# Patient Record
Sex: MALE | Race: WHITE | NOT HISPANIC OR LATINO | URBAN - METROPOLITAN AREA
[De-identification: names, ages, dates, MRNs, and addresses within clinical notes are randomized per-mention and may not be internally consistent; named-entity substitution may affect disease eponyms.]

---

## 2019-04-08 ENCOUNTER — EMERGENCY (EMERGENCY)
Facility: HOSPITAL | Age: 34
LOS: 1 days | Discharge: HOME | End: 2019-04-08
Attending: EMERGENCY MEDICINE | Admitting: EMERGENCY MEDICINE
Payer: MEDICAID

## 2019-04-08 VITALS
TEMPERATURE: 98 F | OXYGEN SATURATION: 100 % | DIASTOLIC BLOOD PRESSURE: 59 MMHG | HEART RATE: 80 BPM | SYSTOLIC BLOOD PRESSURE: 124 MMHG | RESPIRATION RATE: 16 BRPM

## 2019-04-08 PROCEDURE — 99284 EMERGENCY DEPT VISIT MOD MDM: CPT | Mod: 25

## 2019-04-08 PROCEDURE — 93010 ELECTROCARDIOGRAM REPORT: CPT

## 2019-04-08 NOTE — ED ADULT TRIAGE NOTE - CHIEF COMPLAINT QUOTE
pt c/o intermittent left sided chest pain with radiation to left shoulder and left arm x1 week along with VIDAL- denies any PMH- appears in NAD, speaking in full sentences, breathing even and unlabored

## 2019-04-09 VITALS
TEMPERATURE: 98 F | DIASTOLIC BLOOD PRESSURE: 61 MMHG | RESPIRATION RATE: 17 BRPM | OXYGEN SATURATION: 100 % | HEART RATE: 78 BPM | SYSTOLIC BLOOD PRESSURE: 132 MMHG

## 2019-04-09 LAB
ALBUMIN SERPL ELPH-MCNC: 4.3 G/DL — SIGNIFICANT CHANGE UP (ref 3.3–5)
ALP SERPL-CCNC: 51 U/L — SIGNIFICANT CHANGE UP (ref 40–120)
ALT FLD-CCNC: 114 U/L — HIGH (ref 4–41)
ANION GAP SERPL CALC-SCNC: 11 MMO/L — SIGNIFICANT CHANGE UP (ref 7–14)
AST SERPL-CCNC: 77 U/L — HIGH (ref 4–40)
BASOPHILS # BLD AUTO: 0.08 K/UL — SIGNIFICANT CHANGE UP (ref 0–0.2)
BASOPHILS NFR BLD AUTO: 0.9 % — SIGNIFICANT CHANGE UP (ref 0–2)
BILIRUB SERPL-MCNC: 0.4 MG/DL — SIGNIFICANT CHANGE UP (ref 0.2–1.2)
BUN SERPL-MCNC: 18 MG/DL — SIGNIFICANT CHANGE UP (ref 7–23)
CALCIUM SERPL-MCNC: 9.4 MG/DL — SIGNIFICANT CHANGE UP (ref 8.4–10.5)
CHLORIDE SERPL-SCNC: 104 MMOL/L — SIGNIFICANT CHANGE UP (ref 98–107)
CO2 SERPL-SCNC: 27 MMOL/L — SIGNIFICANT CHANGE UP (ref 22–31)
CREAT SERPL-MCNC: 0.86 MG/DL — SIGNIFICANT CHANGE UP (ref 0.5–1.3)
EOSINOPHIL # BLD AUTO: 0.35 K/UL — SIGNIFICANT CHANGE UP (ref 0–0.5)
EOSINOPHIL NFR BLD AUTO: 4.1 % — SIGNIFICANT CHANGE UP (ref 0–6)
GLUCOSE SERPL-MCNC: 108 MG/DL — HIGH (ref 70–99)
HCT VFR BLD CALC: 44.9 % — SIGNIFICANT CHANGE UP (ref 39–50)
HGB BLD-MCNC: 13.9 G/DL — SIGNIFICANT CHANGE UP (ref 13–17)
IMM GRANULOCYTES NFR BLD AUTO: 0.3 % — SIGNIFICANT CHANGE UP (ref 0–1.5)
LYMPHOCYTES # BLD AUTO: 3.05 K/UL — SIGNIFICANT CHANGE UP (ref 1–3.3)
LYMPHOCYTES # BLD AUTO: 35.3 % — SIGNIFICANT CHANGE UP (ref 13–44)
MCHC RBC-ENTMCNC: 28.9 PG — SIGNIFICANT CHANGE UP (ref 27–34)
MCHC RBC-ENTMCNC: 31 % — LOW (ref 32–36)
MCV RBC AUTO: 93.3 FL — SIGNIFICANT CHANGE UP (ref 80–100)
MONOCYTES # BLD AUTO: 0.65 K/UL — SIGNIFICANT CHANGE UP (ref 0–0.9)
MONOCYTES NFR BLD AUTO: 7.5 % — SIGNIFICANT CHANGE UP (ref 2–14)
NEUTROPHILS # BLD AUTO: 4.47 K/UL — SIGNIFICANT CHANGE UP (ref 1.8–7.4)
NEUTROPHILS NFR BLD AUTO: 51.9 % — SIGNIFICANT CHANGE UP (ref 43–77)
NRBC # FLD: 0 K/UL — SIGNIFICANT CHANGE UP (ref 0–0)
PLATELET # BLD AUTO: 181 K/UL — SIGNIFICANT CHANGE UP (ref 150–400)
PMV BLD: 11.9 FL — SIGNIFICANT CHANGE UP (ref 7–13)
POTASSIUM SERPL-MCNC: 4.1 MMOL/L — SIGNIFICANT CHANGE UP (ref 3.5–5.3)
POTASSIUM SERPL-SCNC: 4.1 MMOL/L — SIGNIFICANT CHANGE UP (ref 3.5–5.3)
PROT SERPL-MCNC: 7.2 G/DL — SIGNIFICANT CHANGE UP (ref 6–8.3)
RBC # BLD: 4.81 M/UL — SIGNIFICANT CHANGE UP (ref 4.2–5.8)
RBC # FLD: 11.9 % — SIGNIFICANT CHANGE UP (ref 10.3–14.5)
SODIUM SERPL-SCNC: 142 MMOL/L — SIGNIFICANT CHANGE UP (ref 135–145)
TROPONIN T, HIGH SENSITIVITY: 9 NG/L — SIGNIFICANT CHANGE UP (ref ?–14)
TROPONIN T, HIGH SENSITIVITY: 9 NG/L — SIGNIFICANT CHANGE UP (ref ?–14)
WBC # BLD: 8.63 K/UL — SIGNIFICANT CHANGE UP (ref 3.8–10.5)
WBC # FLD AUTO: 8.63 K/UL — SIGNIFICANT CHANGE UP (ref 3.8–10.5)

## 2019-04-09 PROCEDURE — 71046 X-RAY EXAM CHEST 2 VIEWS: CPT | Mod: 26

## 2019-04-09 RX ORDER — IBUPROFEN 200 MG
600 TABLET ORAL ONCE
Qty: 0 | Refills: 0 | Status: COMPLETED | OUTPATIENT
Start: 2019-04-09 | End: 2019-04-09

## 2019-04-09 RX ADMIN — Medication 600 MILLIGRAM(S): at 03:57

## 2019-04-09 NOTE — ED ADULT NURSE NOTE - OBJECTIVE STATEMENT
Pt is a 33 year old male reporting to the ED for left side chest pain. Pt reports cp started 1 week ago. Pt reports pain radiated to left back and left arm. Pt reports numbness and tingling in left arm. Pt reports numbness increased today causing him to come to the ED. Pt is AOx4. Pt reports VIDAL. Pt respirations even and unlabored, spo2 100 % on room air. Pt appears to be in NAD, resting in bed. Pt reports father recently had MI. Pt placed on cardiac monitor showing NSR. Pt denies fever, n/v/d. Pt reports umbilical hernia. 18 g iv placed in right hand, labs drawn, pending review, will continue to monitor.

## 2019-04-09 NOTE — ED PROVIDER NOTE - ATTENDING CONTRIBUTION TO CARE
33M no PMH p/w pain from L chest, radiating to shoulder, x4d, intermittent, non-exertional. Associated w/ intermittent tingling to L hand and elbow. Pain is worse w/ movement. HAs not taken any pain meds. also w/ several weeks of vague SOB, non-exertional. No other systemic symptoms. Vitals wnl, exam as above.  ddx: Likely MSK, less likely ACs. clinically not PE, tamponade, dissection, PTX, perf, myocarditis, mediastinitis.   CBC, cmp, trop, CXR, symptom control, reassess.

## 2019-04-09 NOTE — ED PROVIDER NOTE - PHYSICAL EXAMINATION
no LE edema, normal equal distal pulses. pain reproduced w/ truncal movement. no overlying skin changes. sensation intact, strength 5/5.

## 2019-04-09 NOTE — ED ADULT NURSE NOTE - NSIMPLEMENTINTERV_GEN_ALL_ED
Implemented All Universal Safety Interventions:  [place] to call system. Call bell, personal items and telephone within reach. Instruct patient to call for assistance. Room bathroom lighting operational. Non-slip footwear when patient is off stretcher. Physically safe environment: no spills, clutter or unnecessary equipment. Stretcher in lowest position, wheels locked, appropriate side rails in place.

## 2019-04-09 NOTE — ED PROVIDER NOTE - NSFOLLOWUPINSTRUCTIONS_ED_ALL_ED_FT
Follow up with your primary physician in 2-3 days. If needed call [phone] to find a primary care physician or call  [phone] to schedule an appointment with the general medicine clinic.     You can take acetaminophen (aka tylenol, 1000 mg every 6 hours) or ibuprofen (600 mg every 6 hours) for pain or fever. Do not exceed 4000 mg acetaminophen (tylenol) in a 24 hour period. Be aware if any of your other medications contain acetaminophen so as not to exceed the maximum daily dose.    Return to the ER for worsening pain, shortness of breath, or any other new concerning symptoms.

## 2019-04-09 NOTE — ED PROVIDER NOTE - CLINICAL SUMMARY MEDICAL DECISION MAKING FREE TEXT BOX
Jonathan Weil, PGY2 - low risk for ACS w/ HEART score = 1 before trops. PERC negative. Will check trops, cxr, if negative suspect msk and will refer for outpt f/u.

## 2019-04-09 NOTE — ED PROVIDER NOTE - MUSCULOSKELETAL, MLM
Palpation along the sternocostal margin of the 6th-7th ribs reproduces pain. Palpation over the L trapezius also reproduces pain

## 2019-04-09 NOTE — ED PROVIDER NOTE - OBJECTIVE STATEMENT
33M no sig PMH p/w chest pain for 4 days. He describes an intermittent left sided tightness that radiates to the back of his L shoulder, associated w/ a burning sensation in this L hand that radiates up his arm. No known inciting/aggravating factors that he knows of, though he does use his arms for physical activity at his job ([employer]). Also w/ mild intermittent shortness of breath, though this does not have a significant exertional component (contrary to triage note). No meds tried for relief. 33M no sig PMH p/w chest pain for 4 days. He describes an intermittent left sided tightness that radiates to the back of his L shoulder, associated w/ a burning sensation in this L hand that radiates up his arm. No known inciting/aggravating factors that he knows of, though he does use his arms for physical activity at his job ([employer]). Also w/ mild intermittent shortness of breath, though this does not have a significant exertional component (contrary to triage note). No meds tried for relief.  [place]: 33M no PMH p/w pain from L chest, radiating to shoulder, x4d, intermittent, non-exertional. Associated w/ intermittent tingling to L hand and elbow. Pain is worse w/ movement. HAs not taken any pain meds. also w/ several weeks of vague SOB, non-exertional. Denies associated NVD, lightheaded, diaphoresis, palpitations, cough/rhinorrhea, black/bloody stool, LE pain/swelling, focal weakness, recent travel/immobilization, abd pain, urinary complaints, f/c. No hormone use. Dad CAD 70s, no other FMH CAD/clots/sudden death. No prior cardiac w/u.

## 2019-11-01 ENCOUNTER — OUTPATIENT (OUTPATIENT)
Dept: OUTPATIENT SERVICES | Facility: HOSPITAL | Age: 34
LOS: 1 days | End: 2019-11-01
Payer: MEDICAID

## 2019-11-01 PROCEDURE — G9001: CPT

## 2019-11-11 DIAGNOSIS — Z71.89 OTHER SPECIFIED COUNSELING: ICD-10-CM

## 2021-09-16 ENCOUNTER — INPATIENT (INPATIENT)
Facility: HOSPITAL | Age: 36
LOS: 1 days | Discharge: HOME | End: 2021-09-18
Attending: STUDENT IN AN ORGANIZED HEALTH CARE EDUCATION/TRAINING PROGRAM | Admitting: STUDENT IN AN ORGANIZED HEALTH CARE EDUCATION/TRAINING PROGRAM
Payer: MEDICAID

## 2021-09-16 ENCOUNTER — TRANSCRIPTION ENCOUNTER (OUTPATIENT)
Age: 36
End: 2021-09-16

## 2021-09-16 VITALS
TEMPERATURE: 98 F | HEIGHT: 70 IN | OXYGEN SATURATION: 99 % | RESPIRATION RATE: 20 BRPM | SYSTOLIC BLOOD PRESSURE: 133 MMHG | DIASTOLIC BLOOD PRESSURE: 80 MMHG | HEART RATE: 98 BPM

## 2021-09-16 DIAGNOSIS — Z98.890 OTHER SPECIFIED POSTPROCEDURAL STATES: Chronic | ICD-10-CM

## 2021-09-16 DIAGNOSIS — R10.9 UNSPECIFIED ABDOMINAL PAIN: ICD-10-CM

## 2021-09-16 LAB
ALBUMIN SERPL ELPH-MCNC: 4.3 G/DL — SIGNIFICANT CHANGE UP (ref 3.3–5)
ALP SERPL-CCNC: 56 U/L — SIGNIFICANT CHANGE UP (ref 40–120)
ALT FLD-CCNC: 45 U/L — HIGH (ref 4–41)
ANION GAP SERPL CALC-SCNC: 13 MMOL/L — SIGNIFICANT CHANGE UP (ref 7–14)
APTT BLD: 32.9 SEC — SIGNIFICANT CHANGE UP (ref 27–36.3)
AST SERPL-CCNC: 33 U/L — SIGNIFICANT CHANGE UP (ref 4–40)
BASOPHILS # BLD AUTO: 0.05 K/UL — SIGNIFICANT CHANGE UP (ref 0–0.2)
BASOPHILS NFR BLD AUTO: 0.5 % — SIGNIFICANT CHANGE UP (ref 0–2)
BILIRUB SERPL-MCNC: 0.4 MG/DL — SIGNIFICANT CHANGE UP (ref 0.2–1.2)
BLD GP AB SCN SERPL QL: NEGATIVE — SIGNIFICANT CHANGE UP
BLOOD GAS VENOUS COMPREHENSIVE RESULT: SIGNIFICANT CHANGE UP
BUN SERPL-MCNC: 12 MG/DL — SIGNIFICANT CHANGE UP (ref 7–23)
CALCIUM SERPL-MCNC: 9.5 MG/DL — SIGNIFICANT CHANGE UP (ref 8.4–10.5)
CHLORIDE SERPL-SCNC: 99 MMOL/L — SIGNIFICANT CHANGE UP (ref 98–107)
CO2 SERPL-SCNC: 28 MMOL/L — SIGNIFICANT CHANGE UP (ref 22–31)
CREAT SERPL-MCNC: 0.91 MG/DL — SIGNIFICANT CHANGE UP (ref 0.5–1.3)
EOSINOPHIL # BLD AUTO: 0.29 K/UL — SIGNIFICANT CHANGE UP (ref 0–0.5)
EOSINOPHIL NFR BLD AUTO: 3 % — SIGNIFICANT CHANGE UP (ref 0–6)
GLUCOSE SERPL-MCNC: 118 MG/DL — HIGH (ref 70–99)
HCT VFR BLD CALC: 40.5 % — SIGNIFICANT CHANGE UP (ref 39–50)
HGB BLD-MCNC: 13.5 G/DL — SIGNIFICANT CHANGE UP (ref 13–17)
IANC: 6.42 K/UL — SIGNIFICANT CHANGE UP (ref 1.5–8.5)
IMM GRANULOCYTES NFR BLD AUTO: 0.4 % — SIGNIFICANT CHANGE UP (ref 0–1.5)
INR BLD: 1.09 RATIO — SIGNIFICANT CHANGE UP (ref 0.88–1.16)
LIDOCAIN IGE QN: 11 U/L — SIGNIFICANT CHANGE UP (ref 7–60)
LYMPHOCYTES # BLD AUTO: 2.2 K/UL — SIGNIFICANT CHANGE UP (ref 1–3.3)
LYMPHOCYTES # BLD AUTO: 22.5 % — SIGNIFICANT CHANGE UP (ref 13–44)
MCHC RBC-ENTMCNC: 29.4 PG — SIGNIFICANT CHANGE UP (ref 27–34)
MCHC RBC-ENTMCNC: 33.3 GM/DL — SIGNIFICANT CHANGE UP (ref 32–36)
MCV RBC AUTO: 88.2 FL — SIGNIFICANT CHANGE UP (ref 80–100)
MONOCYTES # BLD AUTO: 0.76 K/UL — SIGNIFICANT CHANGE UP (ref 0–0.9)
MONOCYTES NFR BLD AUTO: 7.8 % — SIGNIFICANT CHANGE UP (ref 2–14)
NEUTROPHILS # BLD AUTO: 6.42 K/UL — SIGNIFICANT CHANGE UP (ref 1.8–7.4)
NEUTROPHILS NFR BLD AUTO: 65.8 % — SIGNIFICANT CHANGE UP (ref 43–77)
NRBC # BLD: 0 /100 WBCS — SIGNIFICANT CHANGE UP
NRBC # FLD: 0 K/UL — SIGNIFICANT CHANGE UP
PLATELET # BLD AUTO: 192 K/UL — SIGNIFICANT CHANGE UP (ref 150–400)
POTASSIUM SERPL-MCNC: 3.4 MMOL/L — LOW (ref 3.5–5.3)
POTASSIUM SERPL-SCNC: 3.4 MMOL/L — LOW (ref 3.5–5.3)
PROT SERPL-MCNC: 7.4 G/DL — SIGNIFICANT CHANGE UP (ref 6–8.3)
PROTHROM AB SERPL-ACNC: 12.4 SEC — SIGNIFICANT CHANGE UP (ref 10.6–13.6)
RBC # BLD: 4.59 M/UL — SIGNIFICANT CHANGE UP (ref 4.2–5.8)
RBC # FLD: 12 % — SIGNIFICANT CHANGE UP (ref 10.3–14.5)
RH IG SCN BLD-IMP: POSITIVE — SIGNIFICANT CHANGE UP
SARS-COV-2 RNA SPEC QL NAA+PROBE: SIGNIFICANT CHANGE UP
SODIUM SERPL-SCNC: 140 MMOL/L — SIGNIFICANT CHANGE UP (ref 135–145)
TROPONIN T, HIGH SENSITIVITY RESULT: 6 NG/L — SIGNIFICANT CHANGE UP
WBC # BLD: 9.76 K/UL — SIGNIFICANT CHANGE UP (ref 3.8–10.5)
WBC # FLD AUTO: 9.76 K/UL — SIGNIFICANT CHANGE UP (ref 3.8–10.5)

## 2021-09-16 PROCEDURE — 74177 CT ABD & PELVIS W/CONTRAST: CPT | Mod: 26

## 2021-09-16 PROCEDURE — 99222 1ST HOSP IP/OBS MODERATE 55: CPT | Mod: 57

## 2021-09-16 PROCEDURE — 99285 EMERGENCY DEPT VISIT HI MDM: CPT

## 2021-09-16 RX ORDER — HYDROMORPHONE HYDROCHLORIDE 2 MG/ML
1 INJECTION INTRAMUSCULAR; INTRAVENOUS; SUBCUTANEOUS ONCE
Refills: 0 | Status: DISCONTINUED | OUTPATIENT
Start: 2021-09-16 | End: 2021-09-16

## 2021-09-16 RX ORDER — SODIUM CHLORIDE 9 MG/ML
1000 INJECTION INTRAMUSCULAR; INTRAVENOUS; SUBCUTANEOUS ONCE
Refills: 0 | Status: COMPLETED | OUTPATIENT
Start: 2021-09-16 | End: 2021-09-16

## 2021-09-16 RX ORDER — ONDANSETRON 8 MG/1
4 TABLET, FILM COATED ORAL ONCE
Refills: 0 | Status: COMPLETED | OUTPATIENT
Start: 2021-09-16 | End: 2021-09-16

## 2021-09-16 RX ORDER — ENOXAPARIN SODIUM 100 MG/ML
40 INJECTION SUBCUTANEOUS DAILY
Refills: 0 | Status: DISCONTINUED | OUTPATIENT
Start: 2021-09-16 | End: 2021-09-18

## 2021-09-16 RX ORDER — MORPHINE SULFATE 50 MG/1
4 CAPSULE, EXTENDED RELEASE ORAL ONCE
Refills: 0 | Status: DISCONTINUED | OUTPATIENT
Start: 2021-09-16 | End: 2021-09-16

## 2021-09-16 RX ORDER — ACETAMINOPHEN 500 MG
1000 TABLET ORAL ONCE
Refills: 0 | Status: COMPLETED | OUTPATIENT
Start: 2021-09-16 | End: 2021-09-16

## 2021-09-16 RX ORDER — SODIUM CHLORIDE 9 MG/ML
1000 INJECTION, SOLUTION INTRAVENOUS
Refills: 0 | Status: DISCONTINUED | OUTPATIENT
Start: 2021-09-16 | End: 2021-09-18

## 2021-09-16 RX ORDER — POTASSIUM CHLORIDE 20 MEQ
10 PACKET (EA) ORAL ONCE
Refills: 0 | Status: COMPLETED | OUTPATIENT
Start: 2021-09-16 | End: 2021-09-16

## 2021-09-16 RX ADMIN — ONDANSETRON 4 MILLIGRAM(S): 8 TABLET, FILM COATED ORAL at 02:40

## 2021-09-16 RX ADMIN — Medication 100 MILLIEQUIVALENT(S): at 04:44

## 2021-09-16 RX ADMIN — HYDROMORPHONE HYDROCHLORIDE 1 MILLIGRAM(S): 2 INJECTION INTRAMUSCULAR; INTRAVENOUS; SUBCUTANEOUS at 05:25

## 2021-09-16 RX ADMIN — SODIUM CHLORIDE 1000 MILLILITER(S): 9 INJECTION INTRAMUSCULAR; INTRAVENOUS; SUBCUTANEOUS at 02:40

## 2021-09-16 RX ADMIN — SODIUM CHLORIDE 125 MILLILITER(S): 9 INJECTION, SOLUTION INTRAVENOUS at 06:28

## 2021-09-16 RX ADMIN — SODIUM CHLORIDE 1000 MILLILITER(S): 9 INJECTION INTRAMUSCULAR; INTRAVENOUS; SUBCUTANEOUS at 04:44

## 2021-09-16 RX ADMIN — Medication 1000 MILLIGRAM(S): at 17:52

## 2021-09-16 RX ADMIN — Medication 400 MILLIGRAM(S): at 16:37

## 2021-09-16 RX ADMIN — ENOXAPARIN SODIUM 40 MILLIGRAM(S): 100 INJECTION SUBCUTANEOUS at 11:52

## 2021-09-16 RX ADMIN — Medication 400 MILLIGRAM(S): at 11:52

## 2021-09-16 RX ADMIN — Medication 1000 MILLIGRAM(S): at 12:07

## 2021-09-16 RX ADMIN — HYDROMORPHONE HYDROCHLORIDE 1 MILLIGRAM(S): 2 INJECTION INTRAMUSCULAR; INTRAVENOUS; SUBCUTANEOUS at 02:41

## 2021-09-16 NOTE — ED PROVIDER NOTE - OBJECTIVE STATEMENT
35y/o M with no known PMH presents for abd pain. Started having abd pain on 9/14 just above umbilicus where he has known hernia. Was supposed to get repaired but was told to lose wt to help with surgery. Pain has progressively worsened, radiates upwards, and developed N/V and intolerance to PO. No blood in vomitus or stool. Last BM 2d ago and feels bloated and isn't sure he's passing gas. Had umbilical hernia repair 'years ago'. Never had this pain before. No fever, CP, SOB, diarrhea, LE edema. Smoked PCP to help with pain, vape/cigarettes/marinjuana. No IVDA, no other abd surgeries. 37y/o M with no known PMH presents for abd pain. Started having abd pain on 9/14 just above umbilicus where he has known hernia. Was supposed to get repaired but was told to lose wt to help with surgery. Pain has progressively worsened, radiates upwards, and developed N/V and intolerance to PO. No blood in vomitus or stool. Last BM 2d ago and feels bloated and isn't sure he's passing gas. Had umbilical hernia repair 'years ago'. Never had this pain before. No fever, CP, SOB, diarrhea, LE edema. Smoked PCP to help with pain, vape/cigarettes/marijuana. No IVDA, no other abd surgeries.

## 2021-09-16 NOTE — H&P ADULT - ASSESSMENT
36M w/ obesity and hernia repair now presents with abdominal pain, found to have non-bowel containing ventral hernia, with mildly wall thickened SB loops at the level of hernia, w/ possible low grade SBO    Recommendation:  - Admit to B team surgery, Dr. Jiménez  - NPO/ IVF   - serial abdominal exam  - low threshold for NGT if patient vomits/ becomes nauseous    B team surgery  [id number]

## 2021-09-16 NOTE — H&P ADULT - NSHPLABSRESULTS_GEN_ALL_CORE
13.5   9.76  )-----------( 192      ( 16 Sep 2021 03:11 )             40.5     09-16    140  |  99  |  12  ----------------------------<  118<H>  3.4<L>   |  28  |  0.91    Ca    9.5      16 Sep 2021 03:11    TPro  7.4  /  Alb  4.3  /  TBili  0.4  /  DBili  x   /  AST  33  /  ALT  45<H>  /  AlkPhos  56  09-16    PT/INR - ( 16 Sep 2021 03:11 )   PT: 12.4 sec;   INR: 1.09 ratio    PTT - ( 16 Sep 2021 03:11 )  PTT:32.9 sec    IMAGING STUDIES:  < from: CT Abdomen and Pelvis w/ IV Cont (09.16.21 @ 05:20) >      FINDINGS: There is artifact from patient's right side of the body abutting the CT gantry.  LOWER CHEST: Within normal limits.    LIVER: Steatosis.  BILE DUCTS: Normal caliber.  GALLBLADDER: Within normal limits.  SPLEEN: Within normal limits.  PANCREAS: Within normal limits.  ADRENALS: Within normal limits.  KIDNEYS/URETERS: Within normal limits.    BLADDER: Within normal limits.  REPRODUCTIVE ORGANS: Prostate within normal limits.    BOWEL: Fluid-filled mildly thickened loops of mid abdominal small bowel. There is milddistention of the involved loops with a gradual caliber change subjacent to the ventral hernia. Trace mesenteric edema. Appendix is normal.  PERITONEUM: No free air, fluid collection, or ascites.  VESSELS: Within normal limits.  RETROPERITONEUM/LYMPHNODES: No lymphadenopathy.  ABDOMINAL WALL: [place] ventral fat-containing hernia. No infiltration of the fat or bowel herniation.  BONES: Within normal limits.    IMPRESSION:  Prominent fluid-filled mildly thick-walled loops of mid abdominal smallbowel with gradual caliber change at the level of a ventral hernia. There is no bowel entering the hernia sac. Findings may indicate a low-grade small bowel obstruction. Differential includes enteritis. Radiographic follow-up can be performed. Mild bowel wall thickening for which lactate level correlation is recommended to exclude ischemia.    < end of copied text >

## 2021-09-16 NOTE — H&P ADULT - ATTENDING COMMENTS
Patient with sbo, history of umbilical hernia, ct scan reveals enteritis at level of hernia repair  plan  npo  ngt  monitor bowel function  gastrografin challenge    I have personally interviewed and examined this patient, reviewed pertinent labs and imaging, and discussed the case with colleagues, residents, and physician assistants on B Team rounds.    The active care issues are:  1. sbo    The Acute Care Surgery (B Team) Attending Group Practice:  Dr. Radha Sanford, Dr. Jorge Benson, Dr. Krishna Cuello, Dr. Sharath Jiménez,     urgent issues - spectra [id number]  nonurgent issues - [phone]  patient appointments or afterhours - [phone]

## 2021-09-16 NOTE — H&P ADULT - NSICDXPASTSURGICALHX_GEN_ALL_CORE_FT
PAST SURGICAL HISTORY:  No significant past surgical history      PAST SURGICAL HISTORY:  H/O umbilical hernia repair

## 2021-09-16 NOTE — ED PROVIDER NOTE - CLINICAL SUMMARY MEDICAL DECISION MAKING FREE TEXT BOX
mary/pgy1: 35y/o M with known umbilical hernia presents with 70hr of worsening abd pain over area of hernia that isn't reducible with skin changes, N/V and signs of obstruction concerning for incarcerated hernia. Called CT to expedite scan, labs, IV pain meds, antiemetics. Likely surgical admit.

## 2021-09-16 NOTE — ED PROVIDER NOTE - PROGRESS NOTE DETAILS
mary/pgy1: spoke to surgery, they will see pt. mary/pgy1: of note pt spO2 drops to 60-70s when pt deeply asleep, snores. Likely sleep apnea. Improves to 100% the moment pt awakened. Called pt and discussed CT results, agreeable to admission.

## 2021-09-16 NOTE — ED PROVIDER NOTE - PHYSICAL EXAMINATION
CONSTITUTIONAL: moderate painful distress, obese M lying in stretcher obviously in pain  SKIN: Warm dry  HEAD: NCAT  EYES: NL inspection  ENT: MMM  NECK: Supple; non tender.  CARD: RRR  RESP: CTAB  ABD: distended with obvious periumbilical hernia, non-reducible, redness over area, extremely tender to palpation, actively guarding in area  	other quadrants have referred tenderness but no active guarding in those areas, also endorsing inc epigastric pain  EXT: no pedal edema  NEURO: Grossly unremarkable  PSYCH: Cooperative, appropriate.

## 2021-09-16 NOTE — CONSULT NOTE ADULT - ASSESSMENT
36M w/ obesity and hernia repair now presents with abdominal pain, with fat-containing umbilical hernia on CT prelim    Recommendation:  - pain control  - follow up CT A/P final read  - plan pending above  - discussed with attending, Dr. Sharath LOWE team surgery  [id number]

## 2021-09-16 NOTE — ED PROVIDER NOTE - ATTENDING CONTRIBUTION TO CARE
36M denies pmhx p/w severe abd pain x 2-3 days, periumbilical pain rad up to chest in midline.  Known umb hernia repaired, known hernia above that.  (+)N/V and unable to praveena PO, no BM x 2 days.  No fever, appears very uncomfortable.  Ventral abd wall hernia, nonreducible.  (+)smokes, vape, MJ, took PCP.  Mod sev distress abd pain, periumbilical swelling and slight reddish skin change overlying.  Likely periumbilical hernia r/o incarceration.  Surg consulted after eval, rx dilaudid, check CT.  Reass.  VS:  unremarkable    GEN -mod-distress abd pain;   A+O x3  (+)Obese  HEAD - NC/AT     ENT - PEERL, EOMI, mucous membranes  dry , no discharge      NECK: Neck supple, non-tender without lymphadenopathy, no masses, no JVD  PULM - CTA b/l,  symmetric breath sounds  COR -  normal heart sounds    ABD - , ND, periumbilical swelling and slight reddish skin change overlying, otherwise soft,  BACK - no CVA tenderness, nontender spine     EXTREMS - no edema, no deformity, warm and well perfused    SKIN - no rash    or bruising      NEUROLOGIC - alert, face symmetric, speech fluent, sensation nl, motor no focal deficit.

## 2021-09-16 NOTE — ED PROVIDER NOTE - NS ED ROS FT
Constitutional:  See HPI, afebrile  ENMT: No neck pain or stiffness  Cardiac:  No chest pain  Respiratory:  No cough or respiratory distress.   GI:  see HPI, +ve nausea/vomiting, denies bilious or blood, not passing stool or gas  :  No urinary sxs  MS:  No back pain.  Neuro:  No headache   Except as documented in the HPI,  all other systems are negative

## 2021-09-16 NOTE — CONSULT NOTE ADULT - SUBJECTIVE AND OBJECTIVE BOX
GENERAL SURGERY CONSULT NOTE    Patient is a 36y old  Male who presents with a chief complaint of abdominal pain    HPI:  36 year old male with obesity, hx of umbilical hernia repair (14 years ago) presents with abdominal pain x 2 days. reports pain as sharp, epigastric, 10/10 in intensity. Associated with N/V of food/drinks. Last flatus 1 few days ago, last BM 2 days ago. Denies fever, SOB, CP    In ED, patient hemodynamically stable. Labs significant for hypokalemia 3.4. CT prelim shows fat-containing umbilical hernia    10-points review of system performed with pertinent negative and positive findings documented in the HPI     PAST MEDICAL & SURGICAL HISTORY:  No pertinent past medical history    No significant past surgical history    FAMILY HISTORY:  : Family history not pertinent as reviewed with the patient and family    SOCIAL HISTORY: No pertinent social history    MEDICATIONS  (STANDING):    MEDICATIONS  (PRN):    Allergies    No Known Allergies    Intolerances    Vital Signs Last 24 Hrs  T(C): 36.8 (16 Sep 2021 00:37), Max: 36.8 (16 Sep 2021 00:37)  T(F): 98.2 (16 Sep 2021 00:37), Max: 98.2 (16 Sep 2021 00:37)  HR: 76 (16 Sep 2021 05:27) (76 - 98)  BP: 122/56 (16 Sep 2021 05:27) (122/56 - 135/81)  BP(mean): --  RR: 16 (16 Sep 2021 05:27) (16 - 20)  SpO2: 96% (16 Sep 2021 05:27) (96% - 99%)  Daily Height in cm: 177.8 (16 Sep 2021 00:37)    Daily     Exam:  General: resting in stretcher, NAD  Resp: satting well on NC  Abd: obese; soft, ND; right mid-abdominal, LLQ and periumbilical TTP, + rebound; hernia soft nonreducible  Ext: WWP  Neuro: AAOx3                        13.5   9.76  )-----------( 192      ( 16 Sep 2021 03:11 )             40.5     09-16    140  |  99  |  12  ----------------------------<  118<H>  3.4<L>   |  28  |  0.91    Ca    9.5      16 Sep 2021 03:11    TPro  7.4  /  Alb  4.3  /  TBili  0.4  /  DBili  x   /  AST  33  /  ALT  45<H>  /  AlkPhos  56  09-16    PT/INR - ( 16 Sep 2021 03:11 )   PT: 12.4 sec;   INR: 1.09 ratio    PTT - ( 16 Sep 2021 03:11 )  PTT:32.9 sec    IMAGING STUDIES:

## 2021-09-16 NOTE — H&P ADULT - HISTORY OF PRESENT ILLNESS
GENERAL SURGERY CONSULT NOTE    Patient is a 36y old  Male who presents with a chief complaint of abdominal pain    HPI:  36 year old male with obesity, hx of umbilical hernia repair (14 years ago) presents with abdominal pain x 2 days. reports pain as sharp, epigastric, 10/10 in intensity. Associated with N/V of food/drinks. Last flatus 1 few days ago, last BM 2 days ago. Denies fever, SOB, CP    In ED, patient hemodynamically stable. Labs significant for hypokalemia 3.4. CT shows fat-containing umbilical hernia with mild bowel edema

## 2021-09-16 NOTE — ED ADULT NURSE NOTE - OBJECTIVE STATEMENT
Patient is awake, A&O x 4. appears very uncomfortable, moaning and rocking back and forth on the stretcher.  Complaining of severe abdominal pain starting a few hours ago. Protruding hernia noted near umbilicus.  Denies groin or back pain.  No nausea, fever or vomiting.  Patient O2 sat drops in the 80's when he is sleeping, placed on NC @ 2L oxygen.  Respirations equal and unlabored.  No significant medical history.  20g IV left AC, labs drawn and sent, pulse ox attached and will continue to monitor patient.

## 2021-09-16 NOTE — H&P ADULT - NSHPPHYSICALEXAM_GEN_ALL_CORE
Vital Signs Last 24 Hrs  T(C): 36.8 (16 Sep 2021 00:37), Max: 36.8 (16 Sep 2021 00:37)  T(F): 98.2 (16 Sep 2021 00:37), Max: 98.2 (16 Sep 2021 00:37)  HR: 76 (16 Sep 2021 05:27) (76 - 98)  BP: 122/56 (16 Sep 2021 05:27) (122/56 - 135/81)  BP(mean): --  RR: 16 (16 Sep 2021 05:27) (16 - 20)  SpO2: 96% (16 Sep 2021 05:27) (96% - 99%)  Daily Height in cm: 177.8 (16 Sep 2021 00:37)    Daily     Exam:  General: resting in stretcher, NAD  Resp: satting well on NC  Abd: obese; soft, ND; right mid-abdominal, LLQ and periumbilical TTP, + rebound; hernia soft nonreducible  Ext: WWP  Neuro: AAOx3

## 2021-09-17 ENCOUNTER — TRANSCRIPTION ENCOUNTER (OUTPATIENT)
Age: 36
End: 2021-09-17

## 2021-09-17 ENCOUNTER — RESULT REVIEW (OUTPATIENT)
Age: 36
End: 2021-09-17

## 2021-09-17 LAB
ANION GAP SERPL CALC-SCNC: 8 MMOL/L — SIGNIFICANT CHANGE UP (ref 7–14)
APPEARANCE UR: CLEAR — SIGNIFICANT CHANGE UP
BILIRUB UR-MCNC: NEGATIVE — SIGNIFICANT CHANGE UP
BLD GP AB SCN SERPL QL: NEGATIVE — SIGNIFICANT CHANGE UP
BUN SERPL-MCNC: 6 MG/DL — LOW (ref 7–23)
CALCIUM SERPL-MCNC: 8.9 MG/DL — SIGNIFICANT CHANGE UP (ref 8.4–10.5)
CHLORIDE SERPL-SCNC: 100 MMOL/L — SIGNIFICANT CHANGE UP (ref 98–107)
CO2 SERPL-SCNC: 29 MMOL/L — SIGNIFICANT CHANGE UP (ref 22–31)
COLOR SPEC: SIGNIFICANT CHANGE UP
COVID-19 SPIKE DOMAIN AB INTERP: POSITIVE
COVID-19 SPIKE DOMAIN ANTIBODY RESULT: >250 U/ML — HIGH
CREAT SERPL-MCNC: 0.75 MG/DL — SIGNIFICANT CHANGE UP (ref 0.5–1.3)
DIFF PNL FLD: NEGATIVE — SIGNIFICANT CHANGE UP
GLUCOSE SERPL-MCNC: 101 MG/DL — HIGH (ref 70–99)
GLUCOSE UR QL: NEGATIVE — SIGNIFICANT CHANGE UP
HCT VFR BLD CALC: 38.9 % — LOW (ref 39–50)
HGB BLD-MCNC: 12.7 G/DL — LOW (ref 13–17)
KETONES UR-MCNC: NEGATIVE — SIGNIFICANT CHANGE UP
LEUKOCYTE ESTERASE UR-ACNC: NEGATIVE — SIGNIFICANT CHANGE UP
MAGNESIUM SERPL-MCNC: 1.8 MG/DL — SIGNIFICANT CHANGE UP (ref 1.6–2.6)
MCHC RBC-ENTMCNC: 29 PG — SIGNIFICANT CHANGE UP (ref 27–34)
MCHC RBC-ENTMCNC: 32.6 GM/DL — SIGNIFICANT CHANGE UP (ref 32–36)
MCV RBC AUTO: 88.8 FL — SIGNIFICANT CHANGE UP (ref 80–100)
NITRITE UR-MCNC: NEGATIVE — SIGNIFICANT CHANGE UP
NRBC # BLD: 0 /100 WBCS — SIGNIFICANT CHANGE UP
NRBC # FLD: 0 K/UL — SIGNIFICANT CHANGE UP
PH UR: 6.5 — SIGNIFICANT CHANGE UP (ref 5–8)
PHOSPHATE SERPL-MCNC: 3.3 MG/DL — SIGNIFICANT CHANGE UP (ref 2.5–4.5)
PLATELET # BLD AUTO: 166 K/UL — SIGNIFICANT CHANGE UP (ref 150–400)
POTASSIUM SERPL-MCNC: 3.8 MMOL/L — SIGNIFICANT CHANGE UP (ref 3.5–5.3)
POTASSIUM SERPL-SCNC: 3.8 MMOL/L — SIGNIFICANT CHANGE UP (ref 3.5–5.3)
PROT UR-MCNC: NEGATIVE — SIGNIFICANT CHANGE UP
RBC # BLD: 4.38 M/UL — SIGNIFICANT CHANGE UP (ref 4.2–5.8)
RBC # FLD: 12 % — SIGNIFICANT CHANGE UP (ref 10.3–14.5)
RH IG SCN BLD-IMP: POSITIVE — SIGNIFICANT CHANGE UP
SARS-COV-2 IGG+IGM SERPL QL IA: >250 U/ML — HIGH
SARS-COV-2 IGG+IGM SERPL QL IA: POSITIVE
SODIUM SERPL-SCNC: 137 MMOL/L — SIGNIFICANT CHANGE UP (ref 135–145)
SP GR SPEC: 1.01 — SIGNIFICANT CHANGE UP (ref 1–1.05)
UROBILINOGEN FLD QL: SIGNIFICANT CHANGE UP
WBC # BLD: 4.88 K/UL — SIGNIFICANT CHANGE UP (ref 3.8–10.5)
WBC # FLD AUTO: 4.88 K/UL — SIGNIFICANT CHANGE UP (ref 3.8–10.5)

## 2021-09-17 PROCEDURE — 88302 TISSUE EXAM BY PATHOLOGIST: CPT | Mod: 26

## 2021-09-17 RX ORDER — ACETAMINOPHEN 500 MG
975 TABLET ORAL EVERY 6 HOURS
Refills: 0 | Status: DISCONTINUED | OUTPATIENT
Start: 2021-09-17 | End: 2021-09-18

## 2021-09-17 RX ORDER — ONDANSETRON 8 MG/1
4 TABLET, FILM COATED ORAL ONCE
Refills: 0 | Status: DISCONTINUED | OUTPATIENT
Start: 2021-09-17 | End: 2021-09-17

## 2021-09-17 RX ORDER — OXYCODONE HYDROCHLORIDE 5 MG/1
5 TABLET ORAL EVERY 4 HOURS
Refills: 0 | Status: DISCONTINUED | OUTPATIENT
Start: 2021-09-17 | End: 2021-09-18

## 2021-09-17 RX ORDER — HYDROMORPHONE HYDROCHLORIDE 2 MG/ML
0.5 INJECTION INTRAMUSCULAR; INTRAVENOUS; SUBCUTANEOUS
Refills: 0 | Status: DISCONTINUED | OUTPATIENT
Start: 2021-09-17 | End: 2021-09-17

## 2021-09-17 RX ADMIN — Medication 975 MILLIGRAM(S): at 19:57

## 2021-09-17 RX ADMIN — HYDROMORPHONE HYDROCHLORIDE 0.5 MILLIGRAM(S): 2 INJECTION INTRAMUSCULAR; INTRAVENOUS; SUBCUTANEOUS at 13:19

## 2021-09-17 RX ADMIN — HYDROMORPHONE HYDROCHLORIDE 0.5 MILLIGRAM(S): 2 INJECTION INTRAMUSCULAR; INTRAVENOUS; SUBCUTANEOUS at 13:40

## 2021-09-17 RX ADMIN — ENOXAPARIN SODIUM 40 MILLIGRAM(S): 100 INJECTION SUBCUTANEOUS at 16:41

## 2021-09-17 RX ADMIN — OXYCODONE HYDROCHLORIDE 5 MILLIGRAM(S): 5 TABLET ORAL at 23:20

## 2021-09-17 RX ADMIN — Medication 975 MILLIGRAM(S): at 19:29

## 2021-09-17 RX ADMIN — SODIUM CHLORIDE 125 MILLILITER(S): 9 INJECTION, SOLUTION INTRAVENOUS at 13:20

## 2021-09-17 RX ADMIN — OXYCODONE HYDROCHLORIDE 5 MILLIGRAM(S): 5 TABLET ORAL at 23:50

## 2021-09-17 NOTE — CHART NOTE - NSCHARTNOTEFT_GEN_A_CORE
Post-Op Check Surgery B Team    STATUS POST:  Repair, ventral hernia, laparoscopic    Hernia with strangulation      SUBJECTIVE: Patient examined in PACU, lying in bed comfortably with ISS in his lap, states he has been using it. Appreciates some discomfort along midline incision, but says pain is controlled with medications. Patient denies SOB, chest pain, dizziness, palpitations, N/V/D, fever, chills.     ---------------------------------------------------------------------------------------------   VITALS  T(C): 37 (09-17-21 @ 12:20), Max: 37.1 (09-17-21 @ 07:38)  HR: 76 (09-17-21 @ 14:30) (61 - 89)  BP: 107/78 (09-17-21 @ 14:30) (100/57 - 145/89)  RR: 22 (09-17-21 @ 14:30) (12 - 24)  SpO2: 94% (09-17-21 @ 14:30) (89% - 99%)  CAPILLARY BLOOD GLUCOSE      Is/Os    09-16 @ 07:01  -  09-17 @ 07:00  --------------------------------------------------------  IN:    IV PiggyBack: 400 mL    Lactated Ringers: 2625 mL    Oral Fluid: 390 mL  Total IN: 3415 mL    OUT:    Voided (mL): 3600 mL  Total OUT: 3600 mL    Total NET: -185 mL      09-17 @ 07:01  -  09-17 @ 15:35  --------------------------------------------------------  IN:    Lactated Ringers: 375 mL  Total IN: 375 mL    OUT:    Indwelling Catheter - Urethral (mL): 60 mL  Total OUT: 60 mL    Total NET: 315 mL  ---------------------------------------------------------------------------------------------       PHYSICAL EXAM:   General: NAD, Lying in bed comfortably  Neuro: alert, oriented x3  HEENT: NC/AT, EOMI  Neck: Soft, supple  Cardio: RRR, nml S1/S2  Resp: Good effort, CTA b/l  GI/Abd: Soft, appropriate incisional tenderness. Midline incision c/d/i KAYLIN drain with SS OP, >sanguinous  Vascular: All 4 extremities warm.  Skin: Intact, no breakdown  Lymphatic/Nodes: No palpable lymphadenopathy  Musculoskeletal: All 4 extremities moving spontaneously, no limitations, no LE edema    ---------------------------------------------------------------------------------------------   MEDICATIONS (STANDING): acetaminophen   Tablet .. 975 milliGRAM(s) Oral every 6 hours  enoxaparin Injectable 40 milliGRAM(s) SubCutaneous daily  lactated ringers. 1000 milliLiter(s) IV Continuous <Continuous>    MEDICATIONS (PRN):HYDROmorphone  Injectable 0.5 milliGRAM(s) IV Push every 10 minutes PRN Moderate Pain (4 - 6)  ondansetron Injectable 4 milliGRAM(s) IV Push once PRN Nausea and/or Vomiting  oxyCODONE    IR 5 milliGRAM(s) Oral every 4 hours PRN Moderate Pain (4 - 6)      ---------------------------------------------------------------------------------------------   LABS  CBC (09-17 @ 06:26)                              12.7<L>                         4.88    )----------------(  166        --    % Neutrophils, --    % Lymphocytes, ANC: --                                  38.9<L>    BMP (09-17 @ 06:26)             137     |  100     |  6<L>  		Ca++ --      Ca 8.9                ---------------------------------( 101<H>		Mg 1.80               3.8     |  29      |  0.75  			Ph 3.3                   IMAGING STUDIES      ---------------------------------------------------------------------------------------------       ASSESSMENT  36M w/ obesity and hernia repair now presents with abdominal pain, found to have non-bowel containing ventral hernia, with mildly wall thickened SB loops at the level of hernia now s/p Ventral Hernia Repair with mesh. Vertical midline incision was made and the incision was deepened through the fascia.    - Diet: regular as tolerated  - Pain control with PO/IV medications.    - Continue home medications  - ISS  - OOB, ambulate as tolerated  - continue chemical VTE ppx

## 2021-09-17 NOTE — PROGRESS NOTE ADULT - ATTENDING COMMENTS
s/p open VHR with onlay mesh    a.  Diet as tolerated  b.  Minimize IV fluid  c.  Drain teaching  d. Discharge planning

## 2021-09-17 NOTE — DISCHARGE NOTE PROVIDER - HOSPITAL COURSE
36 year old male with obesity, hx of umbilical hernia repair (14 years ago) presented to [facility] ED on 9/16/21 with abdominal pain x 2 days. reports pain as sharp, epigastric, 10/10 in intensity. Associated with N/V of food/drinks. Last flatus 1 few days ago, last BM 2 days ago. Denies fever, SOB, CP  In ED, patient hemodynamically stable. Labs significant for hypokalemia 3.4. CT shows fat-containing umbilical hernia with mild bowel edema.  Patient was admitted to ACS service and brought emergently to OR where he underwent laparoscopic ventral hernia repair......  Tolerated procedure well with no complications.  Patient is currently tolerating regular diet, voiding, ambulating and pain is well controlled.  Per team and attending patient hemodynamically stable for discharge home and follow up in one week.   36 year old male with obesity, hx of umbilical hernia repair (14 years ago) presented to [facility] ED on 9/16/21 with abdominal pain x 2 days. reports pain as sharp, epigastric, 10/10 in intensity. Associated with N/V of food/drinks. Last flatus 1 few days ago, last BM 2 days ago. Denies fever, SOB, CP  In ED, patient hemodynamically stable. Labs significant for hypokalemia 3.4. CT shows fat-containing umbilical hernia with mild bowel edema.  Patient was admitted to ACS service and brought emergently to OR where he underwent laparoscopic ventral hernia repair with mesh, a KAYLIN drain left from OR.    Tolerated procedure well with no complications.  Patient is currently tolerating regular diet, voiding, ambulating and pain is well controlled.  Per team and attending patient hemodynamically stable for discharge home with drain and follow up in one week.

## 2021-09-17 NOTE — PROGRESS NOTE ADULT - ASSESSMENT
36M w/ obesity and hernia repair now presents with abdominal pain, found to have non-bowel containing ventral hernia, with mildly wall thickened SB loops at the level of hernia, w/ possible low grade SBO? now resolved with bm    Recommendation:  - NPO/ IVF   - OR today  - Seen and discussed with B team    B team surgery  [id number]

## 2021-09-17 NOTE — PROGRESS NOTE ADULT - SUBJECTIVE AND OBJECTIVE BOX
Morning Surgical Progress Note  Patient is a 36y old  Male who presents with a chief complaint of Umbilical hernia (16 Sep 2021 05:43)    SUBJECTIVE: Patient seen and examined at bedside with surgical team, patient without complaints. States he had a bm yesterday    Vital Signs Last 24 Hrs  T(C): 37.1 (17 Sep 2021 07:38), Max: 37.1 (17 Sep 2021 07:38)  T(F): 98.8 (17 Sep 2021 07:38), Max: 98.8 (17 Sep 2021 07:38)  HR: 83 (17 Sep 2021 07:38) (69 - 88)  BP: 121/63 (17 Sep 2021 07:38) (105/62 - 145/89)  BP(mean): --  RR: 16 (17 Sep 2021 07:38) (16 - 18)  SpO2: 93% (17 Sep 2021 07:38) (93% - 99%)I&O's Detail    16 Sep 2021 07:01  -  17 Sep 2021 07:00  --------------------------------------------------------  IN:    IV PiggyBack: 400 mL    Lactated Ringers: 2625 mL    Oral Fluid: 390 mL  Total IN: 3415 mL    OUT:    Voided (mL): 3600 mL  Total OUT: 3600 mL    Total NET: -185 mL        Medications  MEDICATIONS  (STANDING):  enoxaparin Injectable 40 milliGRAM(s) SubCutaneous daily  lactated ringers. 1000 milliLiter(s) (125 mL/Hr) IV Continuous <Continuous>    MEDICATIONS  (PRN):    Physical Exam  Constitutional: A&Ox3, NAD, obese  Gastrointestinal: Soft tender near umbilicous nondistended  Extremities: Moving all extremities, no edema  Skin: No Rashes, Hematoma, Ecchymosis  LABS:                        12.7   4.88  )-----------( 166      ( 17 Sep 2021 06:26 )             38.9     09-17    137  |  100  |  6<L>  ----------------------------<  101<H>  3.8   |  29  |  0.75    Ca    8.9      17 Sep 2021 06:26  Phos  3.3       Mg     1.80         TPro  7.4  /  Alb  4.3  /  TBili  0.4  /  DBili  x   /  AST  33  /  ALT  45<H>  /  AlkPhos  56  -    PT/INR - ( 16 Sep 2021 03:11 )   PT: 12.4 sec;   INR: 1.09 ratio   PTT - ( 16 Sep 2021 03:11 )  PTT:32.9 sec  LIVER FUNCTIONS - ( 16 Sep 2021 03:11 )  Alb: 4.3 g/dL / Pro: 7.4 g/dL / ALK PHOS: 56 U/L / ALT: 45 U/L / AST: 33 U/L / GGT: x         Urinalysis Basic - ( 17 Sep 2021 02:21 )  Color: Light Yellow / Appearance: Clear / S.009 / pH: x  Gluc: x / Ketone: Negative  / Bili: Negative / Urobili: <2 mg/dL   Blood: x / Protein: Negative / Nitrite: Negative   Leuk Esterase: Negative / RBC: x / WBC x   Sq Epi: x / Non Sq Epi: x / Bacteria: x  ABO Interpretation: A (21 @ 06:55)

## 2021-09-17 NOTE — DISCHARGE NOTE PROVIDER - NSDCMRMEDTOKEN_GEN_ALL_CORE_FT
acetaminophen 325 mg oral tablet: 3 tab(s) orally every 6 hours, As Needed, maximum dose 4000mg per day  ibuprofen 600 mg oral tablet: 1 tab(s) orally every 6 hours, As Needed - for severe pain, maximum dose 2400mg per day  oxyCODONE 5 mg oral tablet: 1 tab(s) orally every 6 hours, As Needed MDD:4 tabs

## 2021-09-17 NOTE — DISCHARGE NOTE PROVIDER - CARE PROVIDER_API CALL
Jorge Benson)  Surgery; Surgical Critical Care  [address] [place] [address]  Phone: [phone]  Fax: [phone]  Follow Up Time: 1 week

## 2021-09-17 NOTE — DISCHARGE NOTE PROVIDER - NSDCCPTREATMENT_GEN_ALL_CORE_FT
PRINCIPAL PROCEDURE  Procedure: Repair, ventral hernia, laparoscopic  Findings and Treatment:

## 2021-09-17 NOTE — BRIEF OPERATIVE NOTE - OPERATION/FINDINGS
Ventral Hernia Repair with mesh. Vertical midline incision was made and the incision was deepened through the fascia. The hernia sac was identified and dissected free in a circumferential fashion. The hernia sac was explored and did not contain loops of bowel. After excision, the fascia was explored for any defects and none were to be found. After repairing the abdominal wall defect, mesh was sutured to the rectus abdominis to reinforce the repair. A drain was placed midline and is appropriately positioned and draining. Incision was sutured together and reinforced with Dermabond.

## 2021-09-17 NOTE — DISCHARGE NOTE PROVIDER - NSDCCPCAREPLAN_GEN_ALL_CORE_FT
PRINCIPAL DISCHARGE DIAGNOSIS  Diagnosis: Abdominal hernia  Assessment and Plan of Treatment: WOUND CARE:  Keep incisions clean and dry, Do not rub or scrub.  BATHING: Please do not submerge wound underwater. You may shower and/or sponge bathe.  ACTIVITY: No heavy lifting or straining. Otherwise, you may return to your usual level of physical activity. If you are taking narcotic pain medication (such as Percocet) DO NOT drive a car, operate machinery or make important decisions.  DIET: Return to your usual diet.  NOTIFY YOUR SURGEON IF: You have any bleeding that does not stop, any pus draining from your wound(s), any fever (over 100.4 F) or chills, persistent nausea/vomiting, persistent diarrhea, or if your pain is not controlled on your discharge pain medications.  FOLLOW-UP: Please follow up with your primary care physician in one week regarding your hospitalization       PRINCIPAL DISCHARGE DIAGNOSIS  Diagnosis: Abdominal hernia  Assessment and Plan of Treatment: Recoverying appropriately after surgery.  WOUND CARE:  Keep incisions clean and dry, Do not rub or scrub.  BATHING: Please do not submerge wound underwater. You may shower and/or sponge bathe.  ACTIVITY: No heavy lifting or straining. Otherwise, you may return to your usual level of physical activity. If you are taking narcotic pain medication (such as Percocet) DO NOT drive a car, operate machinery or make important decisions.  DIET: Return to your usual diet.  NOTIFY YOUR SURGEON IF: You have any bleeding that does not stop, any pus draining from your wound(s), any fever (over 100.4 F) or chills, persistent nausea/vomiting, persistent diarrhea, or if your pain is not controlled on your discharge pain medications.  FOLLOW-UP: Please follow up with your primary care physician in one week regarding your hospitalization

## 2021-09-18 ENCOUNTER — TRANSCRIPTION ENCOUNTER (OUTPATIENT)
Age: 36
End: 2021-09-18

## 2021-09-18 VITALS
DIASTOLIC BLOOD PRESSURE: 81 MMHG | HEART RATE: 89 BPM | TEMPERATURE: 98 F | SYSTOLIC BLOOD PRESSURE: 134 MMHG | OXYGEN SATURATION: 98 % | RESPIRATION RATE: 16 BRPM

## 2021-09-18 LAB
ANION GAP SERPL CALC-SCNC: 4 MMOL/L — LOW (ref 7–14)
BUN SERPL-MCNC: 10 MG/DL — SIGNIFICANT CHANGE UP (ref 7–23)
CALCIUM SERPL-MCNC: 9.1 MG/DL — SIGNIFICANT CHANGE UP (ref 8.4–10.5)
CHLORIDE SERPL-SCNC: 102 MMOL/L — SIGNIFICANT CHANGE UP (ref 98–107)
CO2 SERPL-SCNC: 28 MMOL/L — SIGNIFICANT CHANGE UP (ref 22–31)
CREAT SERPL-MCNC: 0.73 MG/DL — SIGNIFICANT CHANGE UP (ref 0.5–1.3)
GLUCOSE SERPL-MCNC: 132 MG/DL — HIGH (ref 70–99)
HCT VFR BLD CALC: 40 % — SIGNIFICANT CHANGE UP (ref 39–50)
HGB BLD-MCNC: 13.2 G/DL — SIGNIFICANT CHANGE UP (ref 13–17)
MAGNESIUM SERPL-MCNC: 2 MG/DL — SIGNIFICANT CHANGE UP (ref 1.6–2.6)
MCHC RBC-ENTMCNC: 28.8 PG — SIGNIFICANT CHANGE UP (ref 27–34)
MCHC RBC-ENTMCNC: 33 GM/DL — SIGNIFICANT CHANGE UP (ref 32–36)
MCV RBC AUTO: 87.3 FL — SIGNIFICANT CHANGE UP (ref 80–100)
NRBC # BLD: 0 /100 WBCS — SIGNIFICANT CHANGE UP
NRBC # FLD: 0 K/UL — SIGNIFICANT CHANGE UP
PHOSPHATE SERPL-MCNC: 3.5 MG/DL — SIGNIFICANT CHANGE UP (ref 2.5–4.5)
PLATELET # BLD AUTO: 223 K/UL — SIGNIFICANT CHANGE UP (ref 150–400)
POTASSIUM SERPL-MCNC: 4.1 MMOL/L — SIGNIFICANT CHANGE UP (ref 3.5–5.3)
POTASSIUM SERPL-SCNC: 4.1 MMOL/L — SIGNIFICANT CHANGE UP (ref 3.5–5.3)
RBC # BLD: 4.58 M/UL — SIGNIFICANT CHANGE UP (ref 4.2–5.8)
RBC # FLD: 11.8 % — SIGNIFICANT CHANGE UP (ref 10.3–14.5)
SODIUM SERPL-SCNC: 134 MMOL/L — LOW (ref 135–145)
WBC # BLD: 13.23 K/UL — HIGH (ref 3.8–10.5)
WBC # FLD AUTO: 13.23 K/UL — HIGH (ref 3.8–10.5)

## 2021-09-18 RX ORDER — ACETAMINOPHEN 500 MG
3 TABLET ORAL
Qty: 0 | Refills: 0 | DISCHARGE
Start: 2021-09-18

## 2021-09-18 RX ORDER — OXYCODONE HYDROCHLORIDE 5 MG/1
1 TABLET ORAL
Qty: 14 | Refills: 0
Start: 2021-09-18

## 2021-09-18 RX ORDER — IBUPROFEN 200 MG
1 TABLET ORAL
Qty: 0 | Refills: 0 | DISCHARGE
Start: 2021-09-18

## 2021-09-18 RX ORDER — IBUPROFEN 200 MG
600 TABLET ORAL EVERY 6 HOURS
Refills: 0 | Status: DISCONTINUED | OUTPATIENT
Start: 2021-09-18 | End: 2021-09-18

## 2021-09-18 RX ADMIN — Medication 600 MILLIGRAM(S): at 18:04

## 2021-09-18 RX ADMIN — Medication 975 MILLIGRAM(S): at 00:21

## 2021-09-18 RX ADMIN — Medication 600 MILLIGRAM(S): at 18:30

## 2021-09-18 RX ADMIN — ENOXAPARIN SODIUM 40 MILLIGRAM(S): 100 INJECTION SUBCUTANEOUS at 13:37

## 2021-09-18 RX ADMIN — Medication 975 MILLIGRAM(S): at 06:00

## 2021-09-18 RX ADMIN — Medication 975 MILLIGRAM(S): at 18:30

## 2021-09-18 RX ADMIN — Medication 975 MILLIGRAM(S): at 05:28

## 2021-09-18 RX ADMIN — SODIUM CHLORIDE 125 MILLILITER(S): 9 INJECTION, SOLUTION INTRAVENOUS at 13:36

## 2021-09-18 RX ADMIN — Medication 975 MILLIGRAM(S): at 18:04

## 2021-09-18 RX ADMIN — Medication 975 MILLIGRAM(S): at 14:07

## 2021-09-18 RX ADMIN — Medication 975 MILLIGRAM(S): at 00:51

## 2021-09-18 RX ADMIN — Medication 975 MILLIGRAM(S): at 13:37

## 2021-09-18 NOTE — DISCHARGE NOTE NURSING/CASE MANAGEMENT/SOCIAL WORK - NSDCPEFALRISK_GEN_ALL_CORE
For information on Fall & injury Prevention, visit [email or web address]

## 2021-09-18 NOTE — DISCHARGE NOTE NURSING/CASE MANAGEMENT/SOCIAL WORK - PATIENT PORTAL LINK FT
You can access the FollowMyHealth Patient Portal offered by [facility] by registering at the following website: [email or web address]. By joining [facility]’s FollowMyHealth portal, you will also be able to view your health information using other applications (apps) compatible with our system.

## 2021-09-18 NOTE — PROGRESS NOTE ADULT - SUBJECTIVE AND OBJECTIVE BOX
B TEAM SURGERY DAILY PROGRESS NOTE:     PROCEDURE: Ventral hernia repair with mesh  POD#1    INTERVAL EVENTS:    SUBJECTIVE/ROS: No acute events overnight. Patient seen and examined at bedside by surgical team.     OBJECTIVE:  Vital Signs Last 24 Hrs  T(C): 36.3 (18 Sep 2021 01:55), Max: 37.1 (17 Sep 2021 07:38)  T(F): 97.4 (18 Sep 2021 01:55), Max: 98.8 (17 Sep 2021 07:38)  HR: 77 (18 Sep 2021 01:55) (61 - 89)  BP: 125/85 (18 Sep 2021 01:55) (97/80 - 145/85)  BP(mean): 109 (17 Sep 2021 17:00) (71 - 109)  RR: 20 (18 Sep 2021 01:55) (11 - 24)  SpO2: 99% (18 Sep 2021 01:55) (88% - 100%)                        12.7   4.88  )-----------( 166      ( 17 Sep 2021 06:26 )             38.9     09-17    137  |  100  |  6<L>  ----------------------------<  101<H>  3.8   |  29  |  0.75    Ca    8.9      17 Sep 2021 06:26  Phos  3.3     09-17  Mg     1.80     09-17       I&O's Detail    16 Sep 2021 07:01  -  17 Sep 2021 07:00  --------------------------------------------------------  IN:    IV PiggyBack: 400 mL    Lactated Ringers: 2625 mL    Oral Fluid: 390 mL  Total IN: 3415 mL    OUT:    Voided (mL): 3600 mL  Total OUT: 3600 mL    Total NET: -185 mL      17 Sep 2021 07:01  -  18 Sep 2021 04:13  --------------------------------------------------------  IN:    Lactated Ringers: 1250 mL    Oral Fluid: 660 mL  Total IN: 1910 mL    OUT:    Bulb (mL): 87.5 mL    Indwelling Catheter - Urethral (mL): 2110 mL    IV PiggyBack: 0 mL  Total OUT: 2197.5 mL    Total NET: -287.5 mL          IMAGING:      PHYSICAL EXAM:  Constitutional: NAD  Respiratory: non-labored breathing, patent airway  Gastrointestinal: abdomen soft, nontender, nondistended  Extremities: warm  Neurological: intact           B TEAM SURGERY DAILY PROGRESS NOTE:     PROCEDURE: Ventral hernia repair with mesh  POD#1    INTERVAL EVENTS:     SUBJECTIVE/ROS: No acute events overnight. Patient seen and examined at bedside by surgical team.     OBJECTIVE:  Vital Signs Last 24 Hrs  T(C): 36.3 (18 Sep 2021 01:55), Max: 37.1 (17 Sep 2021 07:38)  T(F): 97.4 (18 Sep 2021 01:55), Max: 98.8 (17 Sep 2021 07:38)  HR: 77 (18 Sep 2021 01:55) (61 - 89)  BP: 125/85 (18 Sep 2021 01:55) (97/80 - 145/85)  BP(mean): 109 (17 Sep 2021 17:00) (71 - 109)  RR: 20 (18 Sep 2021 01:55) (11 - 24)  SpO2: 99% (18 Sep 2021 01:55) (88% - 100%)                        12.7   4.88  )-----------( 166      ( 17 Sep 2021 06:26 )             38.9     09-17    137  |  100  |  6<L>  ----------------------------<  101<H>  3.8   |  29  |  0.75    Ca    8.9      17 Sep 2021 06:26  Phos  3.3     09-17  Mg     1.80     09-17       I&O's Detail    16 Sep 2021 07:01  -  17 Sep 2021 07:00  --------------------------------------------------------  IN:    IV PiggyBack: 400 mL    Lactated Ringers: 2625 mL    Oral Fluid: 390 mL  Total IN: 3415 mL    OUT:    Voided (mL): 3600 mL  Total OUT: 3600 mL    Total NET: -185 mL      17 Sep 2021 07:01  -  18 Sep 2021 04:13  --------------------------------------------------------  IN:    Lactated Ringers: 1250 mL    Oral Fluid: 660 mL  Total IN: 1910 mL    OUT:    Bulb (mL): 87.5 mL    Indwelling Catheter - Urethral (mL): 2110 mL    IV PiggyBack: 0 mL  Total OUT: 2197.5 mL    Total NET: -287.5 mL    PHYSICAL EXAM:  Constitutional: NAD  Respiratory: non-labored breathing, patent airway  Gastrointestinal:  Soft, appropriate incisional tenderness. Midline incision c/d/i KAYLIN drain with SS OP, >sanguinous  Extremities: warm  Neurological: intact           B TEAM SURGERY DAILY PROGRESS NOTE:     PROCEDURE: Ventral hernia repair with mesh  POD#1    INTERVAL EVENTS:     SUBJECTIVE/ROS: No acute events overnight. Patient seen and examined at bedside by surgical team. Denies n/v. Had not taken regular food yet. Denied GI function.     OBJECTIVE:  Vital Signs Last 24 Hrs  T(C): 36.3 (18 Sep 2021 01:55), Max: 37.1 (17 Sep 2021 07:38)  T(F): 97.4 (18 Sep 2021 01:55), Max: 98.8 (17 Sep 2021 07:38)  HR: 77 (18 Sep 2021 01:55) (61 - 89)  BP: 125/85 (18 Sep 2021 01:55) (97/80 - 145/85)  BP(mean): 109 (17 Sep 2021 17:00) (71 - 109)  RR: 20 (18 Sep 2021 01:55) (11 - 24)  SpO2: 99% (18 Sep 2021 01:55) (88% - 100%)                        12.7   4.88  )-----------( 166      ( 17 Sep 2021 06:26 )             38.9     09-17    137  |  100  |  6<L>  ----------------------------<  101<H>  3.8   |  29  |  0.75    Ca    8.9      17 Sep 2021 06:26  Phos  3.3     09-17  Mg     1.80     09-17       I&O's Detail    16 Sep 2021 07:01  -  17 Sep 2021 07:00  --------------------------------------------------------  IN:    IV PiggyBack: 400 mL    Lactated Ringers: 2625 mL    Oral Fluid: 390 mL  Total IN: 3415 mL    OUT:    Voided (mL): 3600 mL  Total OUT: 3600 mL    Total NET: -185 mL      17 Sep 2021 07:01  -  18 Sep 2021 04:13  --------------------------------------------------------  IN:    Lactated Ringers: 1250 mL    Oral Fluid: 660 mL  Total IN: 1910 mL    OUT:    Bulb (mL): 87.5 mL    Indwelling Catheter - Urethral (mL): 2110 mL    IV PiggyBack: 0 mL  Total OUT: 2197.5 mL    Total NET: -287.5 mL    PHYSICAL EXAM:  Constitutional: NAD  Respiratory: non-labored breathing, patent airway  Gastrointestinal:  Soft, appropriate incisional tenderness. Midline incision c/d/i KAYLIN drain with SS OP, >sanguinous  Extremities: warm  Neurological: intact

## 2021-09-18 NOTE — PROGRESS NOTE ADULT - ASSESSMENT
36M w/ obesity and hernia repair now presents with abdominal pain, found to have non-bowel containing ventral hernia, with mildly wall thickened SB loops at the level of hernia, w/ possible low grade SBO? now resolved with bm. Now s/p 9/17 ventral hernia repair with mesh.    PLAN  - Diet: CLD, ADAT  - Deng - d/c on rounds with reasonable TOV *OR* d/c deng at 10pm - NIGHT INTERN THANKS YOU  - Pain: standing Tylenol, oxy PRN  - Monitor bowel function  - Discharge planning    B team surgery  [id number] 36M w/ obesity and hernia repair now presents with abdominal pain, found to have non-bowel containing ventral hernia, with mildly wall thickened SB loops at the level of hernia, w/ possible low grade SBO? now resolved with bm. Now s/p 9/17 ventral hernia repair with mesh.    PLAN  - Diet: CLD, ADAT  - Barraza - d/c on rounds  - Pain: standing Tylenol, oxy PRN  - Monitor bowel function  - Discharge planning    B team surgery  [id number] 36M w/ obesity and hernia repair now presents with abdominal pain, found to have non-bowel containing ventral hernia, with mildly wall thickened SB loops at the level of hernia, w/ possible low grade SBO? now resolved with bm. Now s/p 9/17 ventral hernia repair with mesh.    PLAN  - Diet: low residue diet today  - d/c BRINA Barraza  - Pain: standing Tylenol, oxy PRN  - Monitor bowel function  - Discharge planning: discharge today if having GI function and passing TOV    B team surgery  [id number]

## 2021-09-18 NOTE — DISCHARGE NOTE NURSING/CASE MANAGEMENT/SOCIAL WORK - NSDCPNINST_GEN_ALL_CORE
Please NOTIFY MD for any of the following s/s: S/S infection (Fever >100.4, chills, increased redness, increased bleeding, pus-like drainage from incision line), uncontrolled pain not relieved by pain medications, persistent nausea/vomiting or inability to tolerate diet. No heavy lifting; No driving while taking narcotic pain medications. Please drink 6-8 glasses of water daily to stay hydrated.

## 2021-09-18 NOTE — PROGRESS NOTE ADULT - ATTENDING COMMENTS
I have personally interviewed and examined this patient, reviewed pertinent labs and imaging, and discussed the case with colleagues, residents, and physician assistants on B Team rounds.  More than 50% of this 35 minute encounter including face to face with the patient was spent counseling and/or coordination of care on Ventral hernia repair.  Time included review of vitals, labs, imaging, discussion with consultants.    The active care issues are:  1. Ventral hernia repair     PT is POD 1 s/p ventral hernia repair with onlay mesh. Pain is relatively well controlled. Tolerated clears, has not tried regular food yet. Not yet out of bed or ambulaing. No GI function yet.     - Regular diet   - Home when tolerating regular diet and passes gas.   - Follow up in 10-14d    The Acute Care Surgery (B Team) Attending Group Practice:  Dr. Radha Sanford, Dr. Jorge Benson, Dr. Krishna Cuello, Dr. Sharath Jiménez, Dr. Macie Leroy    urgent issues - spectra [id number]  nonurgent issues - [phone]  patient appointments or afterhours - [phone] I have personally interviewed and examined this patient, reviewed pertinent labs and imaging, and discussed the case with colleagues, residents, and physician assistants on B Team rounds.  More than 50% of this 35 minute encounter including face to face with the patient was spent counseling and/or coordination of care on Ventral hernia repair.  Time included review of vitals, labs, imaging, discussion with consultants.    The active care issues are:  1. Ventral hernia repair     PT is POD 1 s/p ventral hernia repair with onlay mesh. Pain is relatively well controlled. Tolerated clears, has not tried regular food yet. Not yet out of bed or ambulating. No GI function yet.     - Regular diet   - Home when tolerating regular diet and passes gas.   - Follow up in 10-14d  - Keep KAYLIN drain and record output daily     The Acute Care Surgery (B Team) Attending Group Practice:  Dr. Radha Sanford, Dr. Jorge Benson, Dr. Krishna Cuello, Dr. Sharath Jiménez, Dr. Macie Leroy    urgent issues - spectra [id number]  nonurgent issues - [phone]  patient appointments or afterhours - [phone]

## 2021-09-21 PROBLEM — E66.9 OBESITY, UNSPECIFIED: Chronic | Status: ACTIVE | Noted: 2021-09-16

## 2021-09-24 ENCOUNTER — APPOINTMENT (OUTPATIENT)
Dept: TRAUMA SURGERY | Facility: HOSPITAL | Age: 36
End: 2021-09-24

## 2021-09-24 VITALS
SYSTOLIC BLOOD PRESSURE: 106 MMHG | DIASTOLIC BLOOD PRESSURE: 77 MMHG | WEIGHT: 315 LBS | HEIGHT: 71 IN | TEMPERATURE: 97.6 F | HEART RATE: 94 BPM | BODY MASS INDEX: 44.1 KG/M2

## 2021-09-24 LAB — SURGICAL PATHOLOGY STUDY: SIGNIFICANT CHANGE UP

## 2021-10-05 ENCOUNTER — APPOINTMENT (OUTPATIENT)
Dept: TRAUMA SURGERY | Facility: HOSPITAL | Age: 36
End: 2021-10-05

## 2021-10-12 ENCOUNTER — APPOINTMENT (OUTPATIENT)
Dept: TRAUMA SURGERY | Facility: HOSPITAL | Age: 36
End: 2021-10-12

## 2021-10-12 VITALS
DIASTOLIC BLOOD PRESSURE: 68 MMHG | BODY MASS INDEX: 44.1 KG/M2 | WEIGHT: 315 LBS | HEART RATE: 103 BPM | SYSTOLIC BLOOD PRESSURE: 140 MMHG | TEMPERATURE: 97.8 F | HEIGHT: 71 IN

## 2022-07-27 ENCOUNTER — RESULT REVIEW (OUTPATIENT)
Age: 37
End: 2022-07-27

## 2022-07-27 ENCOUNTER — EMERGENCY (EMERGENCY)
Facility: HOSPITAL | Age: 37
LOS: 1 days | Discharge: HOME | End: 2022-07-27
Attending: EMERGENCY MEDICINE
Payer: SELF-PAY

## 2022-07-27 VITALS
WEIGHT: 259.93 LBS | TEMPERATURE: 98 F | RESPIRATION RATE: 16 BRPM | OXYGEN SATURATION: 94 % | SYSTOLIC BLOOD PRESSURE: 130 MMHG | DIASTOLIC BLOOD PRESSURE: 49 MMHG | HEIGHT: 70 IN | HEART RATE: 93 BPM

## 2022-07-27 DIAGNOSIS — Z98.890 OTHER SPECIFIED POSTPROCEDURAL STATES: Chronic | ICD-10-CM

## 2022-07-27 LAB
ALBUMIN SERPL ELPH-MCNC: 3.7 G/DL — SIGNIFICANT CHANGE UP (ref 3.3–5.2)
ALP SERPL-CCNC: 61 U/L — SIGNIFICANT CHANGE UP (ref 40–120)
ALT FLD-CCNC: 41 U/L — HIGH
ANION GAP SERPL CALC-SCNC: 11 MMOL/L — SIGNIFICANT CHANGE UP (ref 5–17)
APTT BLD: 32.4 SEC — SIGNIFICANT CHANGE UP (ref 27.5–35.5)
AST SERPL-CCNC: 28 U/L — SIGNIFICANT CHANGE UP
BASOPHILS # BLD AUTO: 0.09 K/UL — SIGNIFICANT CHANGE UP (ref 0–0.2)
BASOPHILS NFR BLD AUTO: 0.9 % — SIGNIFICANT CHANGE UP (ref 0–2)
BILIRUB SERPL-MCNC: 0.2 MG/DL — LOW (ref 0.4–2)
BUN SERPL-MCNC: 11.7 MG/DL — SIGNIFICANT CHANGE UP (ref 8–20)
CALCIUM SERPL-MCNC: 9.1 MG/DL — SIGNIFICANT CHANGE UP (ref 8.4–10.5)
CHLORIDE SERPL-SCNC: 102 MMOL/L — SIGNIFICANT CHANGE UP (ref 98–107)
CO2 SERPL-SCNC: 26 MMOL/L — SIGNIFICANT CHANGE UP (ref 22–29)
CREAT SERPL-MCNC: 0.77 MG/DL — SIGNIFICANT CHANGE UP (ref 0.5–1.3)
EGFR: 118 ML/MIN/1.73M2 — SIGNIFICANT CHANGE UP
EOSINOPHIL # BLD AUTO: 1.27 K/UL — HIGH (ref 0–0.5)
EOSINOPHIL NFR BLD AUTO: 12.8 % — HIGH (ref 0–6)
GLUCOSE SERPL-MCNC: 105 MG/DL — HIGH (ref 70–99)
HCT VFR BLD CALC: 40.2 % — SIGNIFICANT CHANGE UP (ref 39–50)
HGB BLD-MCNC: 13.2 G/DL — SIGNIFICANT CHANGE UP (ref 13–17)
IMM GRANULOCYTES NFR BLD AUTO: 0.5 % — SIGNIFICANT CHANGE UP (ref 0–1.5)
INR BLD: 0.98 RATIO — SIGNIFICANT CHANGE UP (ref 0.88–1.16)
LYMPHOCYTES # BLD AUTO: 2.29 K/UL — SIGNIFICANT CHANGE UP (ref 1–3.3)
LYMPHOCYTES # BLD AUTO: 23 % — SIGNIFICANT CHANGE UP (ref 13–44)
MCHC RBC-ENTMCNC: 29.7 PG — SIGNIFICANT CHANGE UP (ref 27–34)
MCHC RBC-ENTMCNC: 32.8 GM/DL — SIGNIFICANT CHANGE UP (ref 32–36)
MCV RBC AUTO: 90.5 FL — SIGNIFICANT CHANGE UP (ref 80–100)
MONOCYTES # BLD AUTO: 0.76 K/UL — SIGNIFICANT CHANGE UP (ref 0–0.9)
MONOCYTES NFR BLD AUTO: 7.6 % — SIGNIFICANT CHANGE UP (ref 2–14)
NEUTROPHILS # BLD AUTO: 5.49 K/UL — SIGNIFICANT CHANGE UP (ref 1.8–7.4)
NEUTROPHILS NFR BLD AUTO: 55.2 % — SIGNIFICANT CHANGE UP (ref 43–77)
PLATELET # BLD AUTO: 227 K/UL — SIGNIFICANT CHANGE UP (ref 150–400)
POTASSIUM SERPL-MCNC: 4 MMOL/L — SIGNIFICANT CHANGE UP (ref 3.5–5.3)
POTASSIUM SERPL-SCNC: 4 MMOL/L — SIGNIFICANT CHANGE UP (ref 3.5–5.3)
PROT SERPL-MCNC: 6.9 G/DL — SIGNIFICANT CHANGE UP (ref 6.6–8.7)
PROTHROM AB SERPL-ACNC: 11.4 SEC — SIGNIFICANT CHANGE UP (ref 10.5–13.4)
RBC # BLD: 4.44 M/UL — SIGNIFICANT CHANGE UP (ref 4.2–5.8)
RBC # FLD: 12.2 % — SIGNIFICANT CHANGE UP (ref 10.3–14.5)
SODIUM SERPL-SCNC: 139 MMOL/L — SIGNIFICANT CHANGE UP (ref 135–145)
WBC # BLD: 9.95 K/UL — SIGNIFICANT CHANGE UP (ref 3.8–10.5)
WBC # FLD AUTO: 9.95 K/UL — SIGNIFICANT CHANGE UP (ref 3.8–10.5)

## 2022-07-27 PROCEDURE — 93010 ELECTROCARDIOGRAM REPORT: CPT

## 2022-07-27 PROCEDURE — 99285 EMERGENCY DEPT VISIT HI MDM: CPT

## 2022-07-27 PROCEDURE — 71250 CT THORAX DX C-: CPT | Mod: 26,MA

## 2022-07-27 NOTE — ED PROVIDER NOTE - CARE PROVIDER_API CALL
Adriana Barnett)  Thoracic and Cardiac Surgery  [address] [place] [address]  Phone: [phone]  Fax: [phone]  Follow Up Time: Routine

## 2022-07-27 NOTE — ED PROVIDER NOTE - NSFOLLOWUPINSTRUCTIONS_ED_ALL_ED_FT
Pleural Effusion    WHAT YOU NEED TO KNOW:    Pleural effusion is fluid buildup in the space between the layers of the pleura. The pleura is a thin piece of tissue with 2 layers. One layer rests directly on the lungs. The other rests on the chest wall. There is normally a small amount of fluid between these layers. This fluid helps your lungs move easily when you breathe.  The Lungs         DISCHARGE INSTRUCTIONS:    Call your local emergency number (911 in the [place]) if:   •You find it very hard to breathe.      •You feel faint, or you cannot think clearly.      Seek care immediately if:   •Your breathing problems do not go away, or they get worse.          Call your doctor if:   •Your lips or fingernails turn blue.      •You have a fever.      •Your pain does not go away or gets worse.      •You cough up yellow, green, gray, or bloody mucus.      •You have questions or concerns about your condition or care.      Medicines: You may need any of the following:   •Cardiac medicines may be needed if your pleural effusion is caused by heart failure.      •Antibiotics help treat an infection caused by bacteria.      •NSAIDs help decrease swelling and pain or fever. This medicine is available with or without a doctor's order. NSAIDs can cause stomach bleeding or kidney problems in certain people. If you take blood thinner medicine, always ask your healthcare provider if NSAIDs are safe for you. Always read the medicine label and follow directions.      •Prescription pain medicine may be given. Ask your healthcare provider how to take this medicine safely. Some prescription pain medicines contain acetaminophen. Do not take other medicines that contain acetaminophen without talking to your healthcare provider. Too much acetaminophen may cause liver damage. Prescription pain medicine may cause constipation. Ask your healthcare provider how to prevent or treat constipation.       •Steroids,or other types of medicines, may be given to decrease swelling.      •Take your medicine as directed. Contact your healthcare provider if you think your medicine is not helping or if you have side effects. Tell him or her if you are allergic to any medicine. Keep a list of the medicines, vitamins, and herbs you take. Include the amounts, and when and why you take them. Bring the list or the pill bottles to follow-up visits. Carry your medicine list with you in case of an emergency.      Prevent another pleural effusion: Maintain a healthy lifestyle:  •Eat a variety of healthy foods. Healthy foods help with overall health. Healthy foods include fruit, vegetables, whole-grain breads, low-fat dairy products, beans, lean meat, and fish. Limit sugar, alcohol, and fat.       •Do not smoke and do not allow others to smoke around you. Nicotine and other chemicals in cigarettes and cigars increase your risk for lung infections such as pneumonia. Ask your healthcare provider for information if you currently smoke and need help to quit. E-cigarettes or smokeless tobacco still contain nicotine. Talk to your healthcare provider before you use these products.      •Drink liquids as directed and rest as needed. Liquids help keep your air passages moist. This can help your body get rid of germs and other irritants. Ask your healthcare provider how much liquid to drink each day and which liquids are best for you. You may feel like resting more. Slowly start to do more each day. Rest when you feel it is needed.      •Exercise regularly. Ask about the best exercise plan for you. Exercise will lower your blood pressure and decrease stress. This helps decrease your risk for another pleural effusion, or a lung infection.       Follow up with your doctor or specialist as directed: You may need to see a specialist depending on the cause of your pleural effusion. Write down your questions so you remember to ask them during your visits.
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
## 2022-07-27 NOTE — ED PROVIDER NOTE - PHYSICAL EXAMINATION
Const: Awake, alert and oriented. In no acute distress. Well appearing.  HEENT: NC/AT. Moist mucous membranes.  Eyes: No scleral icterus. EOMI.  Neck:. Soft and supple. Full ROM without pain.  Cardiac: Regular rate and regular rhythm. +S1/S2. No murmurs. Peripheral pulses 2+ and symmetric. No LE edema.  Resp: Speaking in full sentences. No evidence of respiratory distress. Diminished breath sounds in left lower lung fields. No wheezes, rales or rhonchi.  Abd: Soft, non-tender, non-distended. Normal bowel sounds in all 4 quadrants. No guarding or rebound.  Back: Spine midline and non-tender. No CVAT.  Skin: No rashes, abrasions or lacerations.  Neuro: Awake, alert & oriented x 3. Moves all extremities symmetrically.

## 2022-07-27 NOTE — ED PROVIDER NOTE - PROGRESS NOTE DETAILS
Burke: CT surgery saw patient overnight, placed left side pigtail catheter to drain effusion. Chest tube removed. Repeat CXR x 2 is stable. Will contact social work to send patient back to [facility]. chaz: received sign out and following along with resident; chest tube removed; ct surgery instructions/follow up appreciated; agree with resident plan of care

## 2022-07-27 NOTE — ED PROVIDER NOTE - PATIENT PORTAL LINK FT
You can access the FollowMyHealth Patient Portal offered by [facility] by registering at the following website: [email or web address]. By joining [facility]’s FollowMyHealth portal, you will also be able to view your health information using other applications (apps) compatible with our system.

## 2022-07-27 NOTE — ED PROVIDER NOTE - NS ED ROS FT
Const: Denies fever, chills  HEENT: Denies blurry vision, sore throat  Neck: Denies neck pain/stiffness  Resp: Denies coughing, SOB  Cardiovascular: Denies CP, palpitations, LE edema  GI: Denies nausea, vomiting, abdominal pain, diarrhea, constipation, blood in stool  : Denies urinary frequency/urgency/dysuria, hematuria  MSK: + Left chest wall pain. Denies back pain  Neuro: Denies HA, dizziness, numbness, weakness  Skin: Denies rashes.

## 2022-07-27 NOTE — ED PROVIDER NOTE - SHIFT CHANGE DETAILS
Patient signed out pending CT surgery to see, possible chest tube, all further work up and management at the discretion of receiving physician.

## 2022-07-27 NOTE — ED PROVIDER NOTE - CLINICAL SUMMARY MEDICAL DECISION MAKING FREE TEXT BOX
36 y/o M presents for left chest wall pain with possible effusion on XR. Hemodynamically stable, no crepitus to palpation. Will check basic labs and CT chest and reassess.
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
## 2022-07-28 VITALS
HEART RATE: 95 BPM | RESPIRATION RATE: 18 BRPM | TEMPERATURE: 99 F | DIASTOLIC BLOOD PRESSURE: 80 MMHG | OXYGEN SATURATION: 93 % | SYSTOLIC BLOOD PRESSURE: 121 MMHG

## 2022-07-28 DIAGNOSIS — J90 PLEURAL EFFUSION, NOT ELSEWHERE CLASSIFIED: ICD-10-CM

## 2022-07-28 LAB
ALBUMIN FLD-MCNC: 3.1 G/DL — SIGNIFICANT CHANGE UP
B PERT IGG+IGM PNL SER: ABNORMAL
COLOR FLD: ABNORMAL
EOSINOPHIL # FLD: 80 % — SIGNIFICANT CHANGE UP
FLUID INTAKE SUBSTANCE CLASS: SIGNIFICANT CHANGE UP
GLUCOSE FLD-MCNC: 107 MG/DL — SIGNIFICANT CHANGE UP
GRAM STN FLD: SIGNIFICANT CHANGE UP
LDH SERPL L TO P-CCNC: 918 U/L — SIGNIFICANT CHANGE UP
LYMPHOCYTES # FLD: 7 % — SIGNIFICANT CHANGE UP
MONOS+MACROS # FLD: 10 % — SIGNIFICANT CHANGE UP
NEUTROPHILS-BODY FLUID: 3 % — SIGNIFICANT CHANGE UP
PH FLD: 8 — SIGNIFICANT CHANGE UP
PROT FLD-MCNC: 5 G/DL — SIGNIFICANT CHANGE UP
RCV VOL RI: HIGH /UL (ref 0–0)
SPECIMEN SOURCE FLD: SIGNIFICANT CHANGE UP
SPECIMEN SOURCE: SIGNIFICANT CHANGE UP
TOTAL NUCLEATED CELL COUNT, BODY FLUID: 9430 /UL — SIGNIFICANT CHANGE UP
TUBE TYPE: SIGNIFICANT CHANGE UP

## 2022-07-28 PROCEDURE — 99285 EMERGENCY DEPT VISIT HI MDM: CPT | Mod: 25

## 2022-07-28 PROCEDURE — 85730 THROMBOPLASTIN TIME PARTIAL: CPT

## 2022-07-28 PROCEDURE — 71250 CT THORAX DX C-: CPT | Mod: MA

## 2022-07-28 PROCEDURE — 80053 COMPREHEN METABOLIC PANEL: CPT

## 2022-07-28 PROCEDURE — 71045 X-RAY EXAM CHEST 1 VIEW: CPT

## 2022-07-28 PROCEDURE — 85025 COMPLETE CBC W/AUTO DIFF WBC: CPT

## 2022-07-28 PROCEDURE — 71045 X-RAY EXAM CHEST 1 VIEW: CPT | Mod: 26

## 2022-07-28 PROCEDURE — 93005 ELECTROCARDIOGRAM TRACING: CPT

## 2022-07-28 PROCEDURE — 88112 CYTOPATH CELL ENHANCE TECH: CPT | Mod: 26

## 2022-07-28 PROCEDURE — 88112 CYTOPATH CELL ENHANCE TECH: CPT

## 2022-07-28 PROCEDURE — 88305 TISSUE EXAM BY PATHOLOGIST: CPT | Mod: 26

## 2022-07-28 PROCEDURE — 83986 ASSAY PH BODY FLUID NOS: CPT

## 2022-07-28 PROCEDURE — 87205 SMEAR GRAM STAIN: CPT

## 2022-07-28 PROCEDURE — 36415 COLL VENOUS BLD VENIPUNCTURE: CPT

## 2022-07-28 PROCEDURE — 84157 ASSAY OF PROTEIN OTHER: CPT

## 2022-07-28 PROCEDURE — 89051 BODY FLUID CELL COUNT: CPT

## 2022-07-28 PROCEDURE — C1729: CPT

## 2022-07-28 PROCEDURE — 82945 GLUCOSE OTHER FLUID: CPT

## 2022-07-28 PROCEDURE — 87070 CULTURE OTHR SPECIMN AEROBIC: CPT

## 2022-07-28 PROCEDURE — 83615 LACTATE (LD) (LDH) ENZYME: CPT

## 2022-07-28 PROCEDURE — 87075 CULTR BACTERIA EXCEPT BLOOD: CPT

## 2022-07-28 PROCEDURE — 88305 TISSUE EXAM BY PATHOLOGIST: CPT

## 2022-07-28 PROCEDURE — 85610 PROTHROMBIN TIME: CPT

## 2022-07-28 PROCEDURE — 32556 INSERT CATH PLEURA W/O IMAGE: CPT | Mod: XU

## 2022-07-28 PROCEDURE — 87102 FUNGUS ISOLATION CULTURE: CPT

## 2022-07-28 PROCEDURE — 82042 OTHER SOURCE ALBUMIN QUAN EA: CPT

## 2022-07-28 PROCEDURE — 32557 INSERT CATH PLEURA W/ IMAGE: CPT | Mod: LT

## 2022-07-28 NOTE — CHART NOTE - NSCHARTNOTEFT_GEN_A_CORE
SOCIAL WORK NOTE:  ARRANGEMENTS MADE FOR PATIENT TO BE TRANSFERRED BACK TO THEIR UNIT AT [facility]. MD TO MD COMPLETED AND PATIENT ACCEPTED BACK TO UNIT [facility] BY DR CARTER.  AMBULANCE ARRANGED FOR 3:30 PM PICKUP AND TRANSFER TO [facility] TODAY. SPOKE WITH NP- HA. RN -CIELO BLACKMON TO COMPLETE RN TO RN. PHONE NUMBER PROVIDED.

## 2022-07-28 NOTE — ED ADULT NURSE REASSESSMENT NOTE - NS ED NURSE REASSESS COMMENT FT1
Pt received A&O x's 3, resting comfortably. MD at bedside removing chest tube. Denies any chest pain or SOB. VSS. Aide from facility at the bedside with him.

## 2022-07-28 NOTE — PROCEDURE NOTE - NSPROCDETAILS_GEN_ALL_CORE
Seldinger technique/secured in place/sterile dressing applied/percutaneous/thoracostomy tube placed percutaneously/ultrasound assessment of fluid (location)
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
## 2022-07-28 NOTE — CHART NOTE - NSCHARTNOTEFT_GEN_A_CORE
Patient with LT pleural effusion now s/p LT PTC, fully drained.  Chest tube removed.  Small apical pneumothorax noted on post-removal CXR  Pneumothorax stable in size on 4hr repeat CXR  Respiratory status - stable on room air.  Patient okay for discharge back to facility from thoracic surgery perspective at this time.  Recommend repeat CXR as outpatient in 1 week.  May follow up in CT surgery office with Dr. Barnett in CT surgery office in 1-2 weeks  Above plan of care d/w Dr. Barnett.     CT surgery office:  [address] [place] [address], [id number] [phone]

## 2022-08-01 LAB — NON-GYNECOLOGICAL CYTOLOGY STUDY: SIGNIFICANT CHANGE UP

## 2022-08-02 LAB
CULTURE RESULTS: SIGNIFICANT CHANGE UP
SPECIMEN SOURCE: SIGNIFICANT CHANGE UP

## 2022-08-27 LAB
CULTURE RESULTS: SIGNIFICANT CHANGE UP
SPECIMEN SOURCE: SIGNIFICANT CHANGE UP

## 2024-04-20 ENCOUNTER — NON-APPOINTMENT (OUTPATIENT)
Age: 39
End: 2024-04-20